# Patient Record
Sex: FEMALE | Race: WHITE | Employment: STUDENT | URBAN - METROPOLITAN AREA
[De-identification: names, ages, dates, MRNs, and addresses within clinical notes are randomized per-mention and may not be internally consistent; named-entity substitution may affect disease eponyms.]

---
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
## 2024-05-13 ENCOUNTER — HOSPITAL ENCOUNTER (OUTPATIENT)
Dept: PHYSICAL THERAPY | Age: 19
Setting detail: THERAPIES SERIES
Discharge: HOME | End: 2024-05-13
Payer: COMMERCIAL

## 2024-05-13 DIAGNOSIS — M25.562 PAIN IN BOTH KNEES, UNSPECIFIED CHRONICITY: Primary | ICD-10-CM

## 2024-05-13 DIAGNOSIS — M25.561 PAIN IN BOTH KNEES, UNSPECIFIED CHRONICITY: Primary | ICD-10-CM

## 2024-05-13 PROCEDURE — 97110 THERAPEUTIC EXERCISES: CPT | Performed by: PHYSICAL THERAPIST

## 2024-05-13 PROCEDURE — 97161 PT EVAL LOW COMPLEX 20 MIN: CPT | Performed by: PHYSICAL THERAPIST

## 2024-05-13 NOTE — PLAN OF CARE
Progressing: [] Met: [] Not Met: [] Adjusted  2. Patient will demonstrate increased AROM of  to WNL   without pain to allow for proper joint functioning to enable patient to stair negotiation and squatting.   [] Progressing: [] Met: [] Not Met: [] Adjusted  3. Patient will demonstrate increased Strength of proximal hip, quads and HS to at least 5/5 throughout without pain to allow for proper functional mobility to enable patient to return to full swim practice with minimal c/o.   [] Progressing: [] Met: [] Not Met: [] Adjusted  4. Patient will return to walking in community 45 min without increased symptoms or restriction.   [] Progressing: [] Met: [] Not Met: [] Adjusted  5.  Swim breat stroke with minimal pain  [] Progressing: [] Met: [] Not Met: [] Adjusted     Overall Progression Towards Functional goals/ Treatment Progress Update:  [] Patient is progressing as expected towards functional goals listed.    [] Progression is slowed due to complexities/Impairments listed.  [] Progression has been slowed due to co-morbidities.  [x] Plan just implemented, too soon (<30days) to assess goals progression   [] Goals require adjustment due to lack of progress  [] Patient is not progressing as expected and requires additional follow up with physician  [] Other:     TREATMENT PLAN     Frequency/Duration: 1-2x/week for  12  weeks for the following treatment interventions:    Interventions:  Therapeutic Exercise (45671) including: strength training, ROM, and functional mobility  Therapeutic Activities (26174) including: functional mobility training and education.  Neuromuscular Re-education (86362) activation and proprioception, including postural re-education.    Manual Therapy (47271) as indicated to include: Soft Tissue Mobilization and Dry Needling/IASTM  Modalities as needed that may include: Electrical Stimulation, Biofeedback, and Vasoneumatic Compression  Patient education on activity modification and progression of

## 2024-05-16 ENCOUNTER — APPOINTMENT (OUTPATIENT)
Dept: PHYSICAL THERAPY | Age: 19
End: 2024-05-16
Payer: COMMERCIAL

## (undated) DEVICE — ENDOSCOPY KIT: Brand: MEDLINE INDUSTRIES, INC.

## (undated) DEVICE — BITE BLOCK ENDOSCP AD 60 FR W/ ADJ STRP PLAS GRN BLOX